# Patient Record
(demographics unavailable — no encounter records)

---

## 2024-11-20 NOTE — DISCUSSION/SUMMARY
[FreeTextEntry1] : 15 yo F, no PMH, p/w 3-4 days of cough and 2 days of nausea. No fevers. VSS, afebrile here. PE wnl. Symptoms likely 2/2 viral illness. Recommend zofran trial and encouraging PO intake at home.

## 2024-11-20 NOTE — HISTORY OF PRESENT ILLNESS
[de-identified] : Cough + nausea [FreeTextEntry6] : 15 yo F, no PMH, p/w 3-4 days of cough and 2 days of nausea. +chills at night yesterday, no measured or tactile fever. Taking fluids, less solid intake due to nausea. No vomiting, abd pain, diarrhea, or sore throat. No known sick contacts. 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 Has history of cold sores. No oral ulcers or lesions.  LMP 2 weeks ago, regular

## 2024-11-20 NOTE — REVIEW OF SYSTEMS
[Fever] : no fever [Chills] : chills [Malaise] : no malaise [Difficulty with Sleep] : no difficulty with sleep [Night Sweats] : no night sweats [Headache] : headache [Eye Discharge] : no eye discharge [Eye Redness] : no eye redness [Itchy Eyes] : no itchy eyes [Changes in Vision] : no changes in vision [Ear Pain] : no ear pain [Nasal Discharge] : no nasal discharge [Nasal Congestion] : no nasal congestion [Snoring] : no snoring [Sinus Pressure] : no sinus pressure [Sore Throat] : no sore throat [Cyanosis] : no cyanosis [Diaphoresis] : not diaphoretic [Edema] : no edema [Chest Pain] : no chest pain [Intolerance to Exercise] : tolerance to exercise [Tachypnea] : not tachypneic [Wheezing] : no wheezing [Cough] : cough [Congestion] : no congestion [Appetite Changes] : appetite changes [Vomiting] : no vomiting [Diarrhea] : no diarrhea [Abdominal Pain] : no abdominal pain [Weakness] : no weakness [Lightheadness] : no lightheadness [Dizziness] : no dizziness [Myalgia] : no myalgia [Restriction of Motion] : no restriction of motion [Bone Deformity] : no bone deformity [Swelling of Joint] : no swelling of joint [Redness of Joint] : no redness of joint [Changes in Gait] : no changes in gait [Rash] : no rash [Dry Skin] : no dry skin [Itching] : no itching [Insect Bites] : no insect bites [Laceration] : no laceration [Hives] : no hives [Easy Bruising] : no tendency for easy bruising [Bleeding Gums] : no bleeding gums [Enlarged Lymph Nodes] : no enlarged lymph nodes [Tender Lymph Nodes] : non tender  lymph nodes [Dysuria] : no dysuria [Vaginal Dischage] : no vaginal discharge [Breast Swelling] : no breast swelling